# Patient Record
Sex: MALE | Race: WHITE | NOT HISPANIC OR LATINO | Employment: FULL TIME | URBAN - METROPOLITAN AREA
[De-identification: names, ages, dates, MRNs, and addresses within clinical notes are randomized per-mention and may not be internally consistent; named-entity substitution may affect disease eponyms.]

---

## 2018-04-11 ENCOUNTER — OFFICE VISIT CONVERTED (OUTPATIENT)
Dept: FAMILY MEDICINE CLINIC | Facility: CLINIC | Age: 28
End: 2018-04-11
Attending: NURSE PRACTITIONER

## 2018-05-23 ENCOUNTER — OFFICE VISIT CONVERTED (OUTPATIENT)
Dept: UROLOGY | Facility: CLINIC | Age: 28
End: 2018-05-23
Attending: UROLOGY

## 2018-06-15 ENCOUNTER — OFFICE VISIT CONVERTED (OUTPATIENT)
Dept: FAMILY MEDICINE CLINIC | Facility: CLINIC | Age: 28
End: 2018-06-15
Attending: NURSE PRACTITIONER

## 2018-07-20 ENCOUNTER — PROCEDURE VISIT CONVERTED (OUTPATIENT)
Dept: UROLOGY | Facility: CLINIC | Age: 28
End: 2018-07-20
Attending: UROLOGY

## 2018-09-28 ENCOUNTER — OFFICE VISIT CONVERTED (OUTPATIENT)
Dept: UROLOGY | Facility: CLINIC | Age: 28
End: 2018-09-28
Attending: UROLOGY

## 2019-11-08 ENCOUNTER — HOSPITAL ENCOUNTER (OUTPATIENT)
Dept: URGENT CARE | Facility: CLINIC | Age: 29
Discharge: HOME | End: 2019-11-08
Attending: FAMILY MEDICINE

## 2019-11-11 LAB — BACTERIA SPEC AEROBE CULT: NORMAL

## 2019-11-18 ENCOUNTER — OFFICE VISIT CONVERTED (OUTPATIENT)
Dept: FAMILY MEDICINE CLINIC | Facility: CLINIC | Age: 29
End: 2019-11-18
Attending: NURSE PRACTITIONER

## 2020-01-29 ENCOUNTER — HOSPITAL ENCOUNTER (OUTPATIENT)
Dept: CT IMAGING | Facility: HOSPITAL | Age: 30
Discharge: HOME | End: 2020-01-29

## 2020-02-15 ENCOUNTER — HOSPITAL ENCOUNTER (OUTPATIENT)
Dept: URGENT CARE | Facility: CLINIC | Age: 30
Discharge: HOME | End: 2020-02-15
Attending: PHYSICIAN ASSISTANT

## 2020-02-17 LAB — BACTERIA SPEC AEROBE CULT: NORMAL

## 2021-05-15 VITALS
DIASTOLIC BLOOD PRESSURE: 84 MMHG | SYSTOLIC BLOOD PRESSURE: 125 MMHG | HEART RATE: 72 BPM | BODY MASS INDEX: 27.2 KG/M2 | TEMPERATURE: 98 F | WEIGHT: 205.25 LBS | HEIGHT: 73 IN | RESPIRATION RATE: 16 BRPM | OXYGEN SATURATION: 98 %

## 2021-05-16 VITALS
HEIGHT: 73 IN | RESPIRATION RATE: 16 BRPM | BODY MASS INDEX: 29.31 KG/M2 | DIASTOLIC BLOOD PRESSURE: 84 MMHG | SYSTOLIC BLOOD PRESSURE: 137 MMHG | HEART RATE: 86 BPM | TEMPERATURE: 98.7 F | OXYGEN SATURATION: 97 % | WEIGHT: 221.12 LBS

## 2021-05-16 VITALS
HEIGHT: 73 IN | SYSTOLIC BLOOD PRESSURE: 144 MMHG | RESPIRATION RATE: 18 BRPM | DIASTOLIC BLOOD PRESSURE: 92 MMHG | WEIGHT: 215.25 LBS | TEMPERATURE: 98.4 F | HEART RATE: 102 BPM | BODY MASS INDEX: 28.53 KG/M2 | OXYGEN SATURATION: 96 %

## 2021-05-16 VITALS — BODY MASS INDEX: 29.16 KG/M2 | WEIGHT: 220 LBS | RESPIRATION RATE: 15 BRPM | HEIGHT: 73 IN

## 2021-09-14 ENCOUNTER — TELEPHONE (OUTPATIENT)
Dept: UROLOGY | Facility: CLINIC | Age: 31
End: 2021-09-14

## 2021-09-14 NOTE — TELEPHONE ENCOUNTER
Pt wants to have his specimen checked. He had a vasectomy in 2018 by you. Do you want him to schedule a f/u visit or can he just drop off a specimen?      [phone]

## 2021-09-17 ENCOUNTER — OFFICE VISIT (OUTPATIENT)
Dept: UROLOGY | Facility: CLINIC | Age: 31
End: 2021-09-17

## 2021-09-17 VITALS — WEIGHT: 205 LBS | HEIGHT: 73 IN | RESPIRATION RATE: 17 BRPM | BODY MASS INDEX: 27.17 KG/M2

## 2021-09-17 DIAGNOSIS — Z30.2 STERILIZATION: Primary | ICD-10-CM

## 2021-09-17 PROCEDURE — 99212 OFFICE O/P EST SF 10 MIN: CPT | Performed by: UROLOGY

## 2021-09-17 NOTE — PROGRESS NOTES
"Chief Complaint    Urologic complaint    Subjective          Devin Bhardwaj presents to [facility] UROLOGY  History of Present Illness    Patient is status post vasectomy in 2018.  He is not having any issues he wants this specimen checked again    9/21 semen check -no signs of sperm on greater than 20 high-power fields.    Past History:  Medical History: has no past medical history on file.   Surgical History: has a past surgical history that includes Vasectomy.   Family History: family history is not on file.   Social History: reports that he has quit smoking. His smokeless tobacco use includes chew.  Allergies: Azithromycin and Doxycycline hyclate     No current outpatient medications on file.     Physical exam       Alert and orient x3  Well appearing, well developed, in no acute distress   Unlabored respirations        Grossly oriented to person, place and time, judgment is intact, normal mood and affect    No results found for this or any previous visit.     Objective     Vital Signs:   Resp 17   Ht 185.4 cm (73\")   Wt 93 kg (205 lb)   BMI 27.05 kg/m²              Assessment and Plan    Diagnoses and all orders for this visit:    1. Sterilization (Primary)      Semen sample checked in the microscope, no signs of sperm on greater than 20 high-power fields.  Patient given reassurance.  Follow-up as needed  "

## 2025-08-11 ENCOUNTER — TELEPHONE (OUTPATIENT)
Dept: UROLOGY | Age: 35
End: 2025-08-11
Payer: COMMERCIAL

## 2025-08-15 ENCOUNTER — OFFICE VISIT (OUTPATIENT)
Dept: UROLOGY | Age: 35
End: 2025-08-15
Payer: COMMERCIAL

## 2025-08-15 ENCOUNTER — TELEPHONE (OUTPATIENT)
Dept: UROLOGY | Age: 35
End: 2025-08-15

## 2025-08-15 DIAGNOSIS — Z30.2 ENCOUNTER FOR STERILIZATION: Primary | ICD-10-CM

## 2025-08-15 PROCEDURE — 99024 POSTOP FOLLOW-UP VISIT: CPT | Performed by: UROLOGY
